# Patient Record
Sex: MALE | URBAN - METROPOLITAN AREA
[De-identification: names, ages, dates, MRNs, and addresses within clinical notes are randomized per-mention and may not be internally consistent; named-entity substitution may affect disease eponyms.]

---

## 2024-09-05 ENCOUNTER — APPOINTMENT (OUTPATIENT)
Dept: CT IMAGING | Age: 44
End: 2024-09-05

## 2024-09-05 ENCOUNTER — APPOINTMENT (OUTPATIENT)
Dept: GENERAL RADIOLOGY | Age: 44
End: 2024-09-05

## 2024-09-05 ENCOUNTER — HOSPITAL ENCOUNTER (EMERGENCY)
Age: 44
Discharge: LEFT BEFORE DISCHARGE | End: 2024-09-05

## 2024-09-05 VITALS
RESPIRATION RATE: 16 BRPM | SYSTOLIC BLOOD PRESSURE: 135 MMHG | DIASTOLIC BLOOD PRESSURE: 86 MMHG | BODY MASS INDEX: 32.33 KG/M2 | HEART RATE: 77 BPM | OXYGEN SATURATION: 98 % | HEIGHT: 75 IN | TEMPERATURE: 98.1 F | WEIGHT: 260 LBS

## 2024-09-05 DIAGNOSIS — R10.13 EPIGASTRIC PAIN: Primary | ICD-10-CM

## 2024-09-05 PROCEDURE — 99283 EMERGENCY DEPT VISIT LOW MDM: CPT

## 2024-09-05 RX ORDER — KETOROLAC TROMETHAMINE 30 MG/ML
30 INJECTION, SOLUTION INTRAMUSCULAR; INTRAVENOUS
Status: DISCONTINUED | OUTPATIENT
Start: 2024-09-05 | End: 2024-09-05 | Stop reason: HOSPADM

## 2024-09-05 RX ORDER — 0.9 % SODIUM CHLORIDE 0.9 %
1000 INTRAVENOUS SOLUTION INTRAVENOUS ONCE
Status: DISCONTINUED | OUTPATIENT
Start: 2024-09-05 | End: 2024-09-05 | Stop reason: HOSPADM

## 2024-09-05 ASSESSMENT — PAIN - FUNCTIONAL ASSESSMENT: PAIN_FUNCTIONAL_ASSESSMENT: 0-10

## 2024-09-05 ASSESSMENT — LIFESTYLE VARIABLES
HOW OFTEN DO YOU HAVE A DRINK CONTAINING ALCOHOL: NEVER
HOW MANY STANDARD DRINKS CONTAINING ALCOHOL DO YOU HAVE ON A TYPICAL DAY: PATIENT DOES NOT DRINK

## 2024-09-05 ASSESSMENT — PAIN DESCRIPTION - PAIN TYPE: TYPE: ACUTE PAIN

## 2024-09-05 ASSESSMENT — PAIN DESCRIPTION - ORIENTATION: ORIENTATION: MID;UPPER

## 2024-09-05 ASSESSMENT — PAIN DESCRIPTION - LOCATION: LOCATION: ABDOMEN

## 2024-09-05 ASSESSMENT — PAIN SCALES - GENERAL: PAINLEVEL_OUTOF10: 5

## 2024-09-05 NOTE — ED PROVIDER NOTES
my evaluation of him.    I will still place a referral to primary care.    Unable to evaluate this patient any further as he left before lab work can be obtained or imaging.    I will have CT cancel CT abdomen pelvis    Patient had stable vital signs during my physical exam and was not ill-appearing.    ED Course as of 09/05/24 1505   Thu Sep 05, 2024   1418 X-ray and paramedic unable to locate patient on multiple attempts [JG]   1435 Patient has been called for a total of 4 times since I evaluated him.  No answer.  I am going to call number listed in the chart [JG]   1436 No phone number listed for this patient. [JG]      ED Course User Index  [JG] Lavonne Pierce PA     1 or more acute illnesses that pose a threat to life or bodily function.   Shared medical decision making was utilized in creating the patients health plan today.  I independently ordered and reviewed each unique test.     no external sources available for review       test were ordered but they were not performed as patient left shortly after my physical exam              History     43-year-old male with history of hepatitis C, hepatitis B, remote history of IV drug abuse, states prior history of pancreatitis last year for which she was hospitalized presents to the emergency department today with chief complaint of 3 days of bodyaches, sharp upper abdominal pain nausea and multiple episodes of watery stool.  Denies any blood in stool.  Denies any fevers at this time.  He states that this feels very similar to when he had pancreatitis before.  Patient states he is also experiencing bodyaches.  He states he is 6 months clean from illicit substances.    The history is provided by the patient. No  was used.     Physical Exam     Vitals signs and nursing note reviewed:  Vitals:    09/05/24 1246   BP: 135/86   Pulse: 77   Resp: 16   Temp: 98.1 °F (36.7 °C)   TempSrc: Oral   SpO2: 98%   Weight: 117.9 kg (260 lb)   Height: 1.905 m  Procalcitonin    [facility] [address], [place]    Diet NPO    POCT Urine Dipstick    POCT Urine Dipstick    EKG 12 Lead (Select if Upper Abd Pain, or SOB, Diaphoresis or Tachy)    Saline lock IV        Medications given during this emergency department visit:  Medications   ketorolac (TORADOL) injection 30 mg (has no administration in time range)   sodium chloride 0.9 % bolus 1,000 mL (has no administration in time range)       There are no discharge medications for this patient.       No past medical history on file.     No past surgical history on file.           There are no discharge medications for this patient.       No results found for any visits on 09/05/24.      CT ABDOMEN PELVIS W IV CONTRAST Additional Contrast? None    (Results Pending)                No results for input(s): \"COVID19\" in the last 72 hours.     Voice dictation software was used during the making of this note.  This software is not perfect and grammatical and other typographical errors may be present.  This note has not been completely proofread for errors.       Lavonne Pierce PA  09/05/24 1835

## 2024-09-05 NOTE — ED TRIAGE NOTES
Patient states he is short of breath with fatigue and upper abdominal pain x3 days. States he has some nausea. Intermittent headache.